# Patient Record
Sex: MALE | Race: WHITE
[De-identification: names, ages, dates, MRNs, and addresses within clinical notes are randomized per-mention and may not be internally consistent; named-entity substitution may affect disease eponyms.]

---

## 2019-09-27 ENCOUNTER — HOSPITAL ENCOUNTER (EMERGENCY)
Dept: HOSPITAL 62 - ER | Age: 74
Discharge: ANOTHER HOSPITAL | End: 2019-09-27
Payer: MEDICARE

## 2019-09-27 VITALS — SYSTOLIC BLOOD PRESSURE: 149 MMHG | DIASTOLIC BLOOD PRESSURE: 63 MMHG

## 2019-09-27 DIAGNOSIS — R07.9: Primary | ICD-10-CM

## 2019-09-27 LAB
ADD MANUAL DIFF: NO
ALBUMIN SERPL-MCNC: 3.8 G/DL (ref 3.5–5)
ALP SERPL-CCNC: 66 U/L (ref 38–126)
ANION GAP SERPL CALC-SCNC: 9 MMOL/L (ref 5–19)
AST SERPL-CCNC: 44 U/L (ref 17–59)
BASOPHILS # BLD AUTO: 0 10^3/UL (ref 0–0.2)
BASOPHILS NFR BLD AUTO: 0.6 % (ref 0–2)
BILIRUB DIRECT SERPL-MCNC: 0.1 MG/DL (ref 0–0.4)
BILIRUB SERPL-MCNC: 0.4 MG/DL (ref 0.2–1.3)
BUN SERPL-MCNC: 19 MG/DL (ref 7–20)
CALCIUM: 8.4 MG/DL (ref 8.4–10.2)
CHLORIDE SERPL-SCNC: 99 MMOL/L (ref 98–107)
CO2 SERPL-SCNC: 27 MMOL/L (ref 22–30)
EOSINOPHIL # BLD AUTO: 0.1 10^3/UL (ref 0–0.6)
EOSINOPHIL NFR BLD AUTO: 2.4 % (ref 0–6)
ERYTHROCYTE [DISTWIDTH] IN BLOOD BY AUTOMATED COUNT: 13.4 % (ref 11.5–14)
GLUCOSE SERPL-MCNC: 142 MG/DL (ref 75–110)
HCT VFR BLD CALC: 45 % (ref 37.9–51)
HGB BLD-MCNC: 15.4 G/DL (ref 13.5–17)
INR PPP: 1.11
LYMPHOCYTES # BLD AUTO: 0.9 10^3/UL (ref 0.5–4.7)
LYMPHOCYTES NFR BLD AUTO: 16.3 % (ref 13–45)
MCH RBC QN AUTO: 30.3 PG (ref 27–33.4)
MCHC RBC AUTO-ENTMCNC: 34.1 G/DL (ref 32–36)
MCV RBC AUTO: 89 FL (ref 80–97)
MONOCYTES # BLD AUTO: 0.5 10^3/UL (ref 0.1–1.4)
MONOCYTES NFR BLD AUTO: 9.8 % (ref 3–13)
NEUTROPHILS # BLD AUTO: 4 10^3/UL (ref 1.7–8.2)
NEUTS SEG NFR BLD AUTO: 70.9 % (ref 42–78)
PLATELET # BLD: 221 10^3/UL (ref 150–450)
POTASSIUM SERPL-SCNC: 4.3 MMOL/L (ref 3.6–5)
PROT SERPL-MCNC: 6.7 G/DL (ref 6.3–8.2)
PROTHROMBIN TIME: 14.3 SEC (ref 11.4–15.4)
RBC # BLD AUTO: 5.07 10^6/UL (ref 4.35–5.55)
TOTAL CELLS COUNTED % (AUTO): 100 %
WBC # BLD AUTO: 5.6 10^3/UL (ref 4–10.5)

## 2019-09-27 PROCEDURE — 93005 ELECTROCARDIOGRAM TRACING: CPT

## 2019-09-27 PROCEDURE — 99285 EMERGENCY DEPT VISIT HI MDM: CPT

## 2019-09-27 PROCEDURE — 71275 CT ANGIOGRAPHY CHEST: CPT

## 2019-09-27 PROCEDURE — 36415 COLL VENOUS BLD VENIPUNCTURE: CPT

## 2019-09-27 PROCEDURE — 85025 COMPLETE CBC W/AUTO DIFF WBC: CPT

## 2019-09-27 PROCEDURE — 84484 ASSAY OF TROPONIN QUANT: CPT

## 2019-09-27 PROCEDURE — 83735 ASSAY OF MAGNESIUM: CPT

## 2019-09-27 PROCEDURE — 93010 ELECTROCARDIOGRAM REPORT: CPT

## 2019-09-27 PROCEDURE — 85610 PROTHROMBIN TIME: CPT

## 2019-09-27 PROCEDURE — 71045 X-RAY EXAM CHEST 1 VIEW: CPT

## 2019-09-27 PROCEDURE — 80053 COMPREHEN METABOLIC PANEL: CPT

## 2019-09-27 NOTE — RADIOLOGY REPORT (SQ)
EXAM DESCRIPTION:  CTA CHEST



COMPLETED DATE/TIME:  9/27/2019 4:19 pm



REASON FOR STUDY:  recent tavr, r/o leak and PE CHEST PAIN



COMPARISON:  AP CHEST 9/27/2019



TECHNIQUE:  CT scan of the chest performed using helical scanning technique with dynamic intravenous 
contrast injection.  Images reviewed with lung, soft tissue and bone windows.  Reconstructed coronal 
and sagittal MPR images reviewed.

Additional 3 dimensional post-processing performed to develop Maximal Intensity Projection images (MI
P).  All images stored on PACS.

All CT scanners at this facility use dose modulation, iterative reconstruction, and/or weight based d
osing when appropriate to reduce radiation dose to as low as reasonably achievable (ALARA).

[facility]: Dose Right  [facility]: CareDose    [facility]: Dose Right    [facility]: Teradose 4D    [facility]: Smart Technologies



CONTRAST TYPE AND DOSE:  contrast/concentration: Isovue 350.00 mg/ml; Total Contrast Delivered: 80.0 
ml; Total Saline Delivered: 77.0 ml

Contrast bolus optimized for the pulmonary arteries and thoracic aorta.



RENAL FUNCTION:  1.0 CREATININE



RADIATION DOSE:  CT Rad equipment meets quality standard of care and radiation dose reduction techniq
ues were employed. CTDIvol: 19.8 - 33.5 mGy. DLP: 1162 mGy-cm. .



LIMITATIONS:  None.



FINDINGS:  LUNGS AND PLEURA: No masses, infiltrates, or pneumothorax.  No pleural effusions or pleura
l calcifications.

AORTA AND GREAT VESSELS: No thoracic aortic aneurysm or dissection.  Patient is post aortic valve rep
lacement with prosthesis along the aortic root

HEART: No pericardial effusion. No significant coronary artery calcifications.  Calcified mitral dariusz
stefany

PULMONARY ARTERIES: No emboli visualized in the main pulmonary arteries or the segmental branches.

HILAR AND MEDIASTINAL STRUCTURES: No identified masses or abnormal nodes.  Small hiatal hernia

HARDWARE: Aortic valve replacement with prosthesis along the aortic root

UPPER ABDOMEN: Stones in the gallbladder without gross gallbladder wall thickening

THYROID AND OTHER SOFT TISSUES: Right-sided gynecomastia

BONES: No acute or significant finding.

3D MIPS: Confirm above findings.

OTHER: Findings called to Dr. Caputo in the emergency room



IMPRESSION:  NORMAL CTA OF THE CHEST. NO PULMONARY EMBOLI.  NO THORACIC AORTIC DISSECTION



COMMENT:  Quality ID # 436: Final reports with documentation of one or more dose reduction techniques
 (e.g., Automated exposure control, adjustment of the mA and/or kV according to patient size, use of 
iterative reconstruction technique)



TECHNICAL DOCUMENTATION:  JOB ID:  [id number]

 2011 [employer]- All Rights Reserved



Reading location - IP/workstation name: [email or web address]

## 2019-09-27 NOTE — RADIOLOGY REPORT (SQ)
EXAM DESCRIPTION:  CHEST SINGLE VIEW



COMPLETED DATE/TIME:  9/27/2019 3:51 pm



REASON FOR STUDY:  chest pain



COMPARISON:  None.



EXAM PARAMETERS:  NUMBER OF VIEWS: One view.

TECHNIQUE: Single frontal radiographic view of the chest acquired.

RADIATION DOSE: NA

LIMITATIONS: None.



FINDINGS:  LUNGS AND PLEURA: Suboptimal visualization of the lateral left lung base due to overlying 
soft tissue.  No opacities, masses or pneumothorax. No pleural effusion.

MEDIASTINUM AND HILAR STRUCTURES: No masses.  Contour normal.

HEART AND VASCULAR STRUCTURES: Heart upper limits of normal in size.  Normal vasculature.

BONES: No acute findings.

HARDWARE: None in the chest.

OTHER: No other significant finding.



IMPRESSION:  NO ACUTE RADIOGRAPHIC FINDING IN THE CHEST.



TECHNICAL DOCUMENTATION:  JOB ID:  [id number]

 2011 [employer]- All Rights Reserved



Reading location - IP/workstation name: [email or web address]

## 2019-09-28 NOTE — EKG REPORT
SEVERITY:- ABNORMAL ECG -

VENTRICULAR-PACED COMPLEXES VS INCOMPLETE LBBB

CONSIDER LVH

:

Confirmed by: Nas Rushing 28-Sep-2019 09:04:01

## 2024-03-17 NOTE — ER DOCUMENT REPORT
ED General





- General


Chief Complaint: Chest Pain


Stated Complaint: CHEST PAIN


Time Seen by Provider: 09/27/19 15:05


Primary Care Provider: 


MARK DUNCAN MD [Primary Care Provider] - Follow up as needed





- [facility]


Notes: 





Patient presents with what he states his chest discomfort not chest pain that 

started approximately 11 AM this morning with slight shortness of breath.  He 

had a TAVR procedure done Monday this week and provided health and was 

discharged on Wednesday with no complications.  He has been taking all of his 

medications as prescribed.  He states that right now he has a slight discomfort 

in the mid sternal epigastric area but denies any shortness of breath at this 

time.





- Related Data


Allergies/Adverse Reactions: 


                                        





No Known Allergies Allergy (Unverified 09/27/19 16:08)


   











Past Medical History





- Social History


Smoking Status: Unknown if Ever Smoked


Family History: Reviewed & Not Pertinent





Review of Systems





- Review of Systems


Constitutional: No symptoms reported


EENT: No symptoms reported


Cardiovascular: See HPI


Respiratory: No symptoms reported


Gastrointestinal: No symptoms reported


Genitourinary: No symptoms reported


Male Genitourinary: No symptoms reported


Musculoskeletal: No symptoms reported


Skin: No symptoms reported


Hematologic/Lymphatic: No symptoms reported


Neurological/Psychological: No symptoms reported





Physical Exam





- Vital signs


Vitals: 


                                        











Pulse Ox


 


 97 


 


 09/27/19 14:54














- General


General appearance: Appears well, Alert





- HEENT


Head: Normocephalic, Atraumatic


Eyes: Normal


Pupils: PERRL





- Respiratory


Respiratory status: No respiratory distress


Chest status: Nontender


Breath sounds: Normal


Chest palpation: Normal





- Cardiovascular


Rhythm: Regular


Heart sounds: Normal auscultation


Murmur: No


Systolic murmur grade 1-6: 2





- Abdominal


Inspection: Normal


Distension: No distension


Bowel sounds: Normal


Tenderness: Nontender





- Back


Back: Normal, Nontender





- Extremities


General upper extremity: Normal inspection, Normal ROM


General lower extremity: Normal inspection, Normal ROM





- Neurological


Neuro grossly intact: Yes


Cognition: Normal


Orientation: AAOx4





Course





- Re-evaluation


Re-evalutation: 





09/27/19 17:37


Michelle case with Dr. Arielle Dumont patient's doctor who performed TAVR.  Will transfer 

patient for observation back to [facility]


09/27/19 20:43


Repeat troponin shows elevation reevaluation of patient is resting comfortably 

states that his pain is almost nonexistent every now and then just a small 

twinge in mid center epigastric region.  Will provide nitro to see if this helps

relieve his symptoms other though he states that they are most nonexistent.


09/27/19 22:21


Nurse informed me that patient was sleeping comfortably when awoken states he 

had no chest pain at this time.


09/27/19 23:36


Patient was evaluated by me at time of transfer is hemodynamically stable and 

denies any chest pain at this time.





- Vital Signs


Vital signs: 


                                        











Temp Pulse Resp BP Pulse Ox


 


 98.1 F   92   15   149/63 H  94 


 


 09/27/19 23:30  09/27/19 23:30  09/27/19 23:30  09/27/19 23:30  09/27/19 23:30














- Laboratory


Result Diagrams: 


                                 09/27/19 15:15





                                 09/27/19 15:15


Laboratory results interpreted by me: 


                                        











  09/27/19





  15:15


 


Sodium  135.0 L


 


Glucose  142 H














Discharge





- Discharge


Clinical Impression: 


 Chest discomfort





Condition: Good


Disposition: [facility]


Admitting Provider: [facility]


Unit Admitted: [facility]


Referrals: 


MARK DUNCAN MD [Primary Care Provider] - Follow up as needed
Kofi